# Patient Record
Sex: MALE
[De-identification: names, ages, dates, MRNs, and addresses within clinical notes are randomized per-mention and may not be internally consistent; named-entity substitution may affect disease eponyms.]

---

## 2019-02-19 ENCOUNTER — HOSPITAL ENCOUNTER (EMERGENCY)
Dept: HOSPITAL 97 - ER | Age: 19
Discharge: HOME | End: 2019-02-19
Payer: SELF-PAY

## 2019-02-19 DIAGNOSIS — J06.9: Primary | ICD-10-CM

## 2019-02-19 DIAGNOSIS — H66.002: ICD-10-CM

## 2019-02-19 PROCEDURE — 99283 EMERGENCY DEPT VISIT LOW MDM: CPT

## 2019-02-19 NOTE — EDPHYS
Physician Documentation                                                                           

 [facility]                                                                

Name: Jacobo Rivera                                                                                 

Age: 18 yrs                                                                                       

Sex: Male                                                                                         

: 2000                                                                                   

MRN: [id number]                                                                                   

Arrival Date: 2019                                                                          

Time: 23:18                                                                                       

Account#: [id number]                                                                            

Bed 23                                                                                            

Private MD:                                                                                       

ED Physician Orville Curtis                                                                         

HPI:                                                                                              

                                                                                             

02:45 This 18 yrs old  Male presents to ER via Ambulatory with complaints of Ear Pain.snw 

02:45 The patient presents with pain, that is acute. The complaints affect the right ear and  snw 

      left ear. Onset: The symptoms/episode began/occurred suddenly, 3 day(s) ago, and became     

      persistent. Associated signs and symptoms: Pertinent positives: sore throat. Severity       

      of symptoms: At their worst the symptoms were moderate. The patient has experienced a       

      previous episode. It is unknown whether or not the patient has recently seen a              

      physician.                                                                                  

                                                                                                  

Historical:                                                                                       

- Allergies:                                                                                      

                                                                                             

23:28 No Known Allergies;                                                                     lp1 

- Home Meds:                                                                                      

23:28 None [Active];                                                                          lp1 

- PMHx:                                                                                           

23:28 None;                                                                                   lp1 

- PSHx:                                                                                           

23:28 None;                                                                                   lp1 

                                                                                                  

- Immunization history:: Adult Immunizations up to date.                                          

- Social history:: Smoking status: Patient/guardian denies using tobacco.                         

- Ebola Screening: : No symptoms or risks identified at this time.                                

                                                                                                  

                                                                                                  

ROS:                                                                                              

                                                                                             

02:44 Eyes: Negative for injury, pain, redness, and discharge.                                snw 

      Neck: Negative for injury, pain, and swelling, Cardiovascular: Negative for chest pain,     

      palpitations, and edema.                                                                    

      Abdomen/GI: Negative for abdominal pain, nausea, vomiting, diarrhea, and constipation,      

      Back: Negative for injury and pain, : Negative for injury, bleeding, discharge, and       

      swelling, MS/Extremity: Negative for injury and deformity, Skin: Negative for injury,       

      rash, and discoloration, Neuro: Negative for headache, weakness, numbness, tingling,        

      and seizure.                                                                                

      Constitutional: Positive for body aches, chills, malaise.                                   

      ENT: Positive for ear pain.                                                                 

      Respiratory: Positive for cough.                                                            

                                                                                                  

Exam:                                                                                             

02:44 Constitutional:  This is a well developed, well nourished patient who is awake, alert,  snw 

      and in no acute distress. Head/Face:  Normocephalic, atraumatic. Eyes:  Pupils equal        

      round and reactive to light, extra-ocular motions intact.  Lids and lashes normal.          

      Conjunctiva and sclera are non-icteric and not injected.  Cornea within normal limits.      

      Periorbital areas with no swelling, redness, or edema. Neck:  Trachea midline, no           

      thyromegaly or masses palpated, and no cervical lymphadenopathy.  Supple, full range of     

      motion without nuchal rigidity, or vertebral point tenderness.  No Meningismus.             

      Chest/axilla:  Normal chest wall appearance and motion.  Nontender with no deformity.       

      No lesions are appreciated. Cardiovascular:  Regular rate and rhythm with a normal S1       

      and S2.  No gallops, murmurs, or rubs.  Normal PMI, no JVD.  No pulse deficits.             

      Respiratory:  Lungs have equal breath sounds bilaterally, clear to auscultation and         

      percussion.  No rales, rhonchi or wheezes noted.  No increased work of breathing, no        

      retractions or nasal flaring. Abdomen/GI:  Soft, non-tender, with normal bowel sounds.      

      No distension or tympany.  No guarding or rebound.  No evidence of tenderness               

      throughout. Back:  No spinal tenderness.  No costovertebral tenderness.  Full range of      

      motion. Skin:  Warm, dry with normal turgor.  Normal color with no rashes, no lesions,      

      and no evidence of cellulitis. MS/ Extremity:  Pulses equal, no cyanosis.                   

      Neurovascular intact.  Full, normal range of motion. Neuro:  Awake and alert, GCS 15,       

      oriented to person, place, time, and situation.  Cranial nerves II-XII grossly intact.      

      Motor strength 5/5 in all extremities.  Sensory grossly intact.  Cerebellar exam            

      normal.  Normal gait.                                                                       

02:44 ENT: External ear(s): are unremarkable, TM's: erythema, that is moderate, on the left,      

      Nose: is normal, Mouth: is normal, Posterior pharynx: erythema, that is mild, Voice: is     

      normal.                                                                                     

                                                                                                  

Vital Signs:                                                                                      

                                                                                             

23:28  / 99; Pulse 90; Resp 18; Temp 98.6(O); Pulse Ox 98% on R/A; Weight 108.86 kg;    lp1 

      Height 5 ft. 7 in. (170.18 cm); Pain 10/10;                                                 

23:28 Body Mass Index 37.59 (108.86 kg, 170.18 cm)                                            lp1 

                                                                                                  

MDM:                                                                                              

23:35 Patient medically screened.                                                             snw 

02/20                                                                                             

02:45 Data reviewed: vital signs, nurses notes. Data interpreted: Pulse oximetry: on room air snw 

      is 98 %. Interpretation: normal. Counseling: I had a detailed discussion with the           

      patient and/or guardian regarding: the historical points, exam findings, and any            

      diagnostic results supporting the discharge/admit diagnosis, the need for outpatient        

      follow up, to return to the emergency department if symptoms worsen or persist or if        

      there are any questions or concerns that arise at home. Special discussion: I have          

      referred the patient to see his PCP for further evaluation of high blood pressure.          

      Based on the history and exam findings, there is no indication for further emergent         

      testing or inpatient evaluation. I discussed with the patient/guardian the need to see      

      the primary care provider for further evaluation of the symptoms.                           

                                                                                                  

Administered Medications:                                                                         

                                                                                             

23:56 Drug: Norco 5 mg-325 mg 1 tabs Route: PO;                                               rv  

23:59 Follow up: Response: Medication administered at discharge.                              rv  

23:56 Drug: Augmentin 875 mg Route: PO;                                                       rv  

23:59 Follow up: Response: Medication administered at discharge.                              rv  

23:56 Drug: predniSONE 20 mg Route: PO;                                                       rv  

23:59 Follow up: Response: Medication administered at discharge.                              rv  

                                                                                                  

                                                                                                  

Disposition:                                                                                      

                                                                                             

03:12 Co-signature as Attending Physician, Orville Curtis MD.                                    rn  

                                                                                                  

Disposition:                                                                                      

19 23:41 Discharged to Home. Impression: Acute upper respiratory infection, unspecified,    

  Acute suppurative otitis media.                                                                 

- Condition is Stable.                                                                            

- Discharge Instructions: Otitis Media, Adult, Hypertension, Upper Respiratory                    

  Infection, Adult, Cool Mist Vaporizer, Heat Therapy.                                            

- Prescriptions for Augmentin 875- 125 mg Oral Tablet - take 1 tablet by ORAL route               

  every 12 hours for 10 days; 20 tablet. Prednisone 20 mg Oral Tablet - take 2 tablet             

  by ORAL route once daily for 5 days; 10 tablet.                                                 

- Work release form, Medication Reconciliation Form, Thank You Letter, Antibiotic                 

  Education, Prescription Opioid Use form.                                                        

- Follow up: Private Physician; When: 2 - 3 days; Reason: Recheck today's complaints,             

  Continuance of care, Re-evaluation by your physician. Follow up: Emergency                      

  Department; When: As needed; Reason: Worsening of condition.                                    

                                                                                                  

                                                                                                  

                                                                                                  

Signatures:                                                                                       

Luann Swain, FNP-C                 FNP-Csnw                                                  

Orville Curtis MD MD rn Pena, Laura, RN                         RN   lp1                                                  

Jalen, Vik, RN                    RN   rv                                                   

                                                                                                  

Corrections: (The following items were deleted from the chart)                                    

                                                                                             

23:58 23:41 2019 23:41 Discharged to Home. Impression: Acute upper respiratory          rv  

      infection, unspecified; Acute suppurative otitis media. Condition is Stable. Forms are      

      Medication Reconciliation Form, Thank You Letter, Antibiotic Education, Prescription        

      Opioid Use. Follow up: Private Physician; When: 2 - 3 days; Reason: Recheck today's         

      complaints, Continuance of care, Re-evaluation by your physician. Follow up: Emergency      

      Department; When: As needed; Reason: Worsening of condition. snw                            

                                                                                                  

**************************************************************************************************

## 2019-07-26 ENCOUNTER — HOSPITAL ENCOUNTER (EMERGENCY)
Dept: HOSPITAL 97 - ER | Age: 19
Discharge: HOME | End: 2019-07-26
Payer: SELF-PAY

## 2019-07-26 DIAGNOSIS — J02.9: Primary | ICD-10-CM

## 2019-07-26 DIAGNOSIS — Z88.0: ICD-10-CM

## 2019-07-26 PROCEDURE — 99284 EMERGENCY DEPT VISIT MOD MDM: CPT

## 2019-07-26 PROCEDURE — 86308 HETEROPHILE ANTIBODY SCREEN: CPT

## 2019-07-26 PROCEDURE — 36415 COLL VENOUS BLD VENIPUNCTURE: CPT

## 2019-07-26 PROCEDURE — 87070 CULTURE OTHR SPECIMN AEROBIC: CPT

## 2019-07-26 PROCEDURE — 87081 CULTURE SCREEN ONLY: CPT

## 2019-07-26 NOTE — EDPHYS
Physician Documentation                                                                           

 [facility]\""                                                                 

Name: Jacobo Rivera                                                                                 

Age: 18 yrs                                                                                       

Sex: Male                                                                                         

: 2000                                                                                   

MRN: [id number]                                                                                   

Arrival Date: 2019                                                                          

Time: 13:48                                                                                       

Account#: [id number]                                                                            

Bed 27                                                                                            

Private MD:                                                                                       

[facility] Physician Everardo Nair                                                                      

HPI:                                                                                              

                                                                                             

14:48 This 18 yrs old  Male presents to ER via Ambulatory with complaints of          kb  

      Headache, Nausea/Vomiting, Weakness.                                                        

14:48 The patient presents with sore throat. The patient describes throat pain as constant.   kb  

      Onset: The symptoms/episode began/occurred 2 day(s) ago. Severity of symptoms: At their     

      worst the symptoms were moderate, in the emergency department the symptoms are              

      unchanged. Modifying factors: The symptoms are alleviated by nothing, the symptoms are      

      aggravated by swallowing, Patient's oral intake status: good. Associated signs and          

      symptoms: Pertinent positives: chills, fever, flu-like symptoms, malaise, Sore throat       

      vomiting. The patient has not experienced similar symptoms in the past. The patient has     

      not recently seen a physician. Pt reports cough for 2 weeks, sore throat for 2 days and     

      fever today. States he tried to go to work but vomited when he was there and was tired      

      and weak so he went home. .                                                                 

                                                                                                  

Historical:                                                                                       

- Allergies:                                                                                      

14:35 PENICILLINS;                                                                            ss  

- Home Meds:                                                                                      

14:35 None [Active];                                                                          ss  

- PMHx:                                                                                           

14:35 None;                                                                                   ss  

- PSHx:                                                                                           

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  

                                                                                                  

- Immunization history:: Adult Immunizations up to date.                                          

- Social history:: Smoking status: Patient/guardian denies using tobacco.                         

- Ebola Screening: : Patient denies exposure to infectious person Patient denies travel           

  to an Ebola-affected area in the 21 days before illness onset.                                  

                                                                                                  

                                                                                                  

ROS:                                                                                              

14:45 Neck: Negative for injury, pain, and swelling, Cardiovascular: Negative for chest pain, kb  

      palpitations, and edema, Back: Negative for injury and pain, MS/Extremity: Negative for     

      injury and deformity, Skin: Negative for injury, rash, and discoloration, Neuro:            

      Negative for headache, weakness, numbness, tingling, and seizure.                           

14:45 Constitutional: Positive for body aches, chills, fatigue, fever, malaise, Negative for      

      poor PO intake, weight loss.                                                                

14:45 ENT: Positive for sore throat.                                                              

14:45 Respiratory: Positive for cough, Negative for dyspnea on exertion, hemoptysis,              

      orthopnea, pleurisy, shortness of breath, sputum production, wheezing.                      

14:45 Abdomen/GI: Positive for vomiting, Negative for abdominal pain, diarrhea, constipation.     

                                                                                                  

Exam:                                                                                             

14:45 Constitutional:  This is a well developed, well nourished patient who is awake, alert,  kb  

      and in no acute distress. Head/Face:  Normocephalic, atraumatic. Neck:  Trachea             

      midline, no thyromegaly or masses palpated, and no cervical lymphadenopathy.  Supple,       

      full range of motion without nuchal rigidity, or vertebral point tenderness.  No            

      Meningismus. Chest/axilla:  Normal chest wall appearance and motion.  Nontender with no     

      deformity.  No lesions are appreciated. Cardiovascular:  Regular rate and rhythm with a     

      normal S1 and S2.  No gallops, murmurs, or rubs.  Normal PMI, no JVD.  No pulse             

      deficits. Respiratory:  Lungs have equal breath sounds bilaterally, clear to                

      auscultation and percussion.  No rales, rhonchi or wheezes noted.  No increased work of     

      breathing, no retractions or nasal flaring. Abdomen/GI:  Soft, non-tender, with normal      

      bowel sounds.  No distension or tympany.  No guarding or rebound.  No evidence of           

      tenderness throughout. Skin:  Warm, dry with normal turgor.  Normal color with no           

      rashes, no lesions, and no evidence of cellulitis. MS/ Extremity:  Pulses equal, no         

      cyanosis.  Neurovascular intact.  Full, normal range of motion. Neuro:  Awake and           

      alert, GCS 15, oriented to person, place, time, and situation.  Cranial nerves II-XII       

      grossly intact.  Motor strength 5/5 in all extremities.  Sensory grossly intact.            

      Cerebellar exam normal.  Normal gait.                                                       

14:45 ENT: Posterior pharynx: Airway: normal, no evidence of obstruction, patent, Tonsils:        

      bilaterally enlarged, with erythema, Uvula: normal, midline, swelling, that is              

      moderate, erythema, that is moderate, exudate, that is mild.                                

                                                                                                  

Vital Signs:                                                                                      

14:33  / 68; Pulse 128; Resp 22; Temp 103(O); Pulse Ox 96% on R/A; Weight 113.4 kg;     ss  

      Height 5 ft. 7 in. (170.18 cm); Pain 7/10;                                                  

15:40  / 61; Pulse 115; Resp 16 S; Pulse Ox 100% on R/A;                                ca1 

16:50  / 80; Pulse 108; Resp 16 S; Temp 99.8(O); Pulse Ox 97% on R/A;                   ca1 

17:05 Temp 99.8(O);                                                                           ca1 

14:33 Body Mass Index 39.16 (113.40 kg, 170.18 cm)                                            ss  

                                                                                                  

Jennifer Coma Score:                                                                               

14:43 Eye Response: spontaneous(4). Verbal Response: oriented(5). Motor Response: obeys       kb  

      commands(6). Total: 15.                                                                     

                                                                                                  

MDM:                                                                                              

14:43 Patient medically screened.                                                             kb  

14:43 Data reviewed: vital signs, nurses notes. Data interpreted: Pulse oximetry: on room air kb  

      is 96 %. Interpretation: normal. Counseling: I had a detailed discussion with the           

      patient and/or guardian regarding: the historical points, exam findings, and any            

      diagnostic results supporting the discharge/admit diagnosis, lab results, the need for      

      outpatient follow up, a family practitioner, to return to the emergency department if       

      symptoms worsen or persist or if there are any questions or concerns that arise at home.    

                                                                                                  

                                                                                             

14:36 Order name: Strep; Complete Time: 15:24                                                 ss  

                                                                                             

15:25 Order name: Throat Culture                                                              EDMS

                                                                                             

15:24 Order name: Mono Screen Profile; Complete Time: 16:42                                   kb  

                                                                                             

16:44 Order name: Vital Signs; Complete Time: 16:50                                           kb  

                                                                                                  

Administered Medications:                                                                         

14:40 Drug: Motrin 800 mg Route: PO;                                                          ss  

17:05 Follow up: Temp 99.8 Oral; Response: No adverse reaction; Temperature is decreased      ca1 

                                                                                                  

                                                                                                  

Disposition:                                                                                      

                                                                                             

09:58 Co-signature as Attending Physician, Everardo Nair MD I agree with the assessment and  vishal 

      plan of care.                                                                               

                                                                                                  

Disposition:                                                                                      

19 16:52 Discharged to Home. Impression: Acute pharyngitis.                                 

- Condition is Stable.                                                                            

- Discharge Instructions: Pharyngitis, Easy-to-Read, Sore Throat, Easy-to-Read.                   

- Prescriptions for Zithromax 500 mg Oral Tablet - take 1 tablet by ORAL route once               

  daily for 5 days; 5 tablet.                                                                     

- Medication Reconciliation Form, Thank You Letter, Antibiotic Education, Prescription            

  Opioid Use, Work release form form.                                                             

- Follow up: Emergency Department; When: As needed; Reason: Worsening of condition.               

  Follow up: Private Physician; When: 2 - 3 days; Reason: Recheck today's complaints,             

  Continuance of care, Re-evaluation by your physician.                                           

                                                                                                  

                                                                                                  

                                                                                                  

Signatures:                                                                                       

Dispatcher [employer]                           [facility]                                                 

Netta Pacheco, FNP-C                 BETTIEP-Everardo Villafana MD MD cha Smirch, Shelby, ARVIND                      RN   ss                                                   

Adele Lion RN                        RN   ca1                                                  

                                                                                                  

Corrections: (The following items were deleted from the chart)                                    

                                                                                             

14:47 14:38 Influenza Screen (A \T\ B)+[id number] ordered. [facility]

17:18 16:52 2019 16:52 Discharged to Home. Impression: Acute pharyngitis. Condition is  ca1 

      Stable. Forms are Medication Reconciliation Form, Thank You Letter, Antibiotic              

      Education, Prescription Opioid Use. Follow up: Emergency Department; When: As needed;       

      Reason: Worsening of condition. Follow up: Private Physician; When: 2 - 3 days; Reason:     

      Recheck today's complaints, Continuance of care, Re-evaluation by your physician. kb        

                                                                                                  

**************************************************************************************************

## 2019-07-26 NOTE — ER
Nurse's Notes                                                                                     

 [facility]                                                                 

Name: Jacobo Rivera                                                                                 

Age: 18 yrs                                                                                       

Sex: Male                                                                                         

: 2000                                                                                   

MRN: [id number]                                                                                   

Arrival Date: 2019                                                                          

Time: 13:48                                                                                       

Account#: [id number]                                                                            

Bed 27                                                                                            

Private MD:                                                                                       

Diagnosis: Acute pharyngitis                                                                      

                                                                                                  

Presentation:                                                                                     

                                                                                             

14:34 Presenting complaint: Patient states: headache, body aches and fatigue that began this  ss  

      morning. Sore throat x 2 days and cough. Transition of care: patient was not received       

      from another setting of care. Onset of symptoms was 2019. Risk Assessment: Do      

      you want to hurt yourself or someone else? Patient reports no desire to harm self or        

      others. Initial Sepsis Screen: Does the patient meet any 2 criteria? No. Patient's          

      initial sepsis screen is negative. Does the patient have a suspected source of              

      infection? No. Patient's initial sepsis screen is negative. Care prior to arrival: None.    

14:34 Method Of Arrival: Ambulatory                                                           ss  

14:34 Acuity: NIKITA 3                                                                           ss  

                                                                                                  

Historical:                                                                                       

- Allergies:                                                                                      

14:35 PENICILLINS;                                                                            ss  

- Home Meds:                                                                                      

14:35 None [Active];                                                                          ss  

- PMHx:                                                                                           

14:35 None;                                                                                   ss  

- PSHx:                                                                                           

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  

                                                                                                  

- Immunization history:: Adult Immunizations up to date.                                          

- Social history:: Smoking status: Patient/guardian denies using tobacco.                         

- Ebola Screening: : Patient denies exposure to infectious person Patient denies travel           

  to an Ebola-affected area in the 21 days before illness onset.                                  

                                                                                                  

                                                                                                  

Screening:                                                                                        

15:40 Abuse screen: Denies threats or abuse. Denies injuries from another. Nutritional        ca1 

      screening: No deficits noted. Tuberculosis screening: No symptoms or risk factors           

      identified. Fall Risk None identified.                                                      

                                                                                                  

Assessment:                                                                                       

15:40 General: Appears in no apparent distress. comfortable, Behavior is calm, cooperative,   ca1 

      appropriate for age. Pain: Denies pain. Neuro: Level of Consciousness is awake, alert,      

      obeys commands, Oriented to person, place, time, situation. Cardiovascular: Heart tones     

      S1 S2 present Capillary refill < 3 seconds Patient's skin is warm and dry. Respiratory:     

      Airway is patent Respiratory effort is even, unlabored, Respiratory pattern is regular,     

      symmetrical, Breath sounds are clear bilaterally. GI: Abdomen is round non-distended,       

      Bowel sounds present X 4 quads. Abd is soft and non tender X 4 quads. : No deficits       

      noted. No signs and/or symptoms were reported regarding the genitourinary system. EENT:     

      No deficits noted. No signs and/or symptoms were reported regarding the EENT system.        

      Derm: Skin is intact, is healthy with good turgor, Skin is pink, warm \T\ dry.              

      Musculoskeletal: Circulation, motion, and sensation intact. Capillary refill < 3            

      seconds, Range of motion: intact in all extremities.                                        

16:50 Reassessment: Patient appears in no apparent distress at this time. Patient and/or      ca1 

      family updated on plan of care and expected duration. Pain level reassessed. Patient is     

      alert, oriented x 3, equal unlabored respirations, skin warm/dry/pink.                      

                                                                                                  

Vital Signs:                                                                                      

14:33  / 68; Pulse 128; Resp 22; Temp 103(O); Pulse Ox 96% on R/A; Weight 113.4 kg;     ss  

      Height 5 ft. 7 in. (170.18 cm); Pain 7/10;                                                  

15:40  / 61; Pulse 115; Resp 16 S; Pulse Ox 100% on R/A;                                ca1 

16:50  / 80; Pulse 108; Resp 16 S; Temp 99.8(O); Pulse Ox 97% on R/A;                   ca1 

17:05 Temp 99.8(O);                                                                           ca1 

14:33 Body Mass Index 39.16 (113.40 kg, 170.18 cm)                                            ss  

                                                                                                  

Jennifer Coma Score:                                                                               

14:43 Eye Response: spontaneous(4). Verbal Response: oriented(5). Motor Response: obeys       kb  

      commands(6). Total: 15.                                                                     

                                                                                                  

ED Course:                                                                                        

13:48 Patient arrived in ED.                                                                  as  

14:33 Arm band placed on right wrist.                                                         ss  

14:35 Triage completed.                                                                       ss  

14:38 Netta Pacheco FNP-C is PHCP.                                                        kb  

14:38 Everardo Nair MD is Attending Physician.                                             kb  

15:37 Adele Lion, RN is Primary Nurse.                                                      ca1 

15:40 Patient has correct armband on for positive identification. Bed in low position. Call   ca1 

      light in reach. Side rails up X 1. Pulse ox on. NIBP on.                                    

15:40 No provider procedures requiring assistance completed. Patient did not have IV access   ca1 

      during this emergency room visit.                                                           

16:08 Mono Screen Profile Sent.                                                               ca1 

                                                                                                  

Administered Medications:                                                                         

14:40 Drug: Motrin 800 mg Route: PO;                                                          ss  

17:05 Follow up: Temp 99.8 Oral; Response: No adverse reaction; Temperature is decreased      ca1 

                                                                                                  

                                                                                                  

Outcome:                                                                                          

16:52 Discharge ordered by MD.                                                                jacek  

17:17 Discharged to home ambulatory.                                                          ca1 

17:17 Condition: stable                                                                           

17:17 Discharge instructions given to patient, Instructed on discharge instructions, follow       

      up and referral plans. medication usage, Demonstrated understanding of instructions,        

      follow-up care, medications, Prescriptions given X 1.                                       

17:18 Patient left the ED.                                                                    ca1 

                                                                                                  

Signatures:                                                                                       

Netta Pacheco, FNP-C                 FNP-Juli Turner Shelby, ARVIND                      RN   ss                                                   

Adele Lion RN                        RN   ca1                                                  

                                                                                                  

**************************************************************************************************

## 2022-05-06 NOTE — ER
Nurse's Notes                                                                                     

 [facility]                                                                

Name: Jacobo Rivera                                                                                 

Age: 18 yrs                                                                                       

Sex: Male                                                                                         

: 2000                                                                                   

MRN: [id number]                                                                                   

Arrival Date: 2019                                                                          

Time: 23:18                                                                                       

Account#: [id number]                                                                            

Bed 23                                                                                            

Private MD:                                                                                       

Diagnosis: Acute upper respiratory infection, unspecified;Acute suppurative otitis media          

                                                                                                  

Presentation:                                                                                     

                                                                                             

23:27 Presenting complaint: Patient states: Bilateral ear pain for a couple days now; states  lp1 

      having congestion prior to ear pain. Transition of care: patient was not received from      

      another setting of care. Onset of symptoms was 2019. Risk Assessment: Do       

      you want to hurt yourself or someone else? Patient reports no desire to harm self or        

      others. Initial Sepsis Screen: Does the patient meet any 2 criteria? No. Patient's          

      initial sepsis screen is negative. Does the patient have a suspected source of              

      infection? No. Patient's initial sepsis screen is negative. Care prior to arrival: None.    

23:27 Method Of Arrival: Ambulatory                                                           lp1 

23:27 Acuity: NIKITA 5                                                                           lp1 

                                                                                                  

Triage Assessment:                                                                                

23:56 General: Appears.                                                                       rv  

                                                                                                  

Historical:                                                                                       

- Allergies:                                                                                      

23:28 No Known Allergies;                                                                     lp1 

- Home Meds:                                                                                      

23:28 None [Active];                                                                          lp1 

- PMHx:                                                                                           

23:28 None;                                                                                   lp1 

- PSHx:                                                                                           

23:28 None;                                                                                   lp1 

                                                                                                  

- Immunization history:: Adult Immunizations up to date.                                          

- Social history:: Smoking status: Patient/guardian denies using tobacco.                         

- Ebola Screening: : No symptoms or risks identified at this time.                                

                                                                                                  

                                                                                                  

Screenin:29 Abuse screen: Denies threats or abuse. Denies injuries from another. Nutritional        lp1 

      screening: No deficits noted. Tuberculosis screening: No symptoms or risk factors           

      identified. Fall Risk None identified.                                                      

                                                                                                  

Assessment:                                                                                       

23:56 General: Appears in no apparent distress. uncomfortable, Behavior is calm, cooperative. rv  

      Pain: Complains of pain in BOTH EARS. Neuro: Level of Consciousness is awake, alert,        

      obeys commands, Oriented to person, place, time, situation. Cardiovascular: Capillary       

      refill < 3 seconds. Respiratory: Airway is patent. GI: No signs and/or symptoms were        

      reported involving the gastrointestinal system. : No signs and/or symptoms were           

      reported regarding the genitourinary system. EENT: Tympanic membrane. Derm: Skin is         

      intact. Musculoskeletal: No signs and/or symptoms reported regarding the                    

      musculoskeletal system.                                                                     

                                                                                                  

Vital Signs:                                                                                      

23:28  / 99; Pulse 90; Resp 18; Temp 98.6(O); Pulse Ox 98% on R/A; Weight 108.86 kg;    lp1 

      Height 5 ft. 7 in. (170.18 cm); Pain 10/10;                                                 

23:28 Body Mass Index 37.59 (108.86 kg, 170.18 cm)                                            lp1 

                                                                                                  

ED Course:                                                                                        

23:18 Patient arrived in ED.                                                                  es  

23:21 Luann Swain FNP-C is PHCP.                                                        snw 

23:21 Orville Curtis MD is Attending Physician.                                                snw 

23:28 Triage completed.                                                                       lp1 

23:29 Arm band placed on left wrist.                                                          lp1 

23:29 Patient has correct armband on for positive identification.                             lp1 

23:57 No provider procedures requiring assistance completed. Patient did not have IV access   rv  

      during this emergency room visit.                                                           

                                                                                                  

Administered Medications:                                                                         

23:56 Drug: Norco 5 mg-325 mg 1 tabs Route: PO;                                               rv  

23:59 Follow up: Response: Medication administered at discharge.                              rv  

23:56 Drug: Augmentin 875 mg Route: PO;                                                       rv  

23:59 Follow up: Response: Medication administered at discharge.                              rv  

23:56 Drug: predniSONE 20 mg Route: PO;                                                       rv  

23:59 Follow up: Response: Medication administered at discharge.                              rv  

                                                                                                  

                                                                                                  

Outcome:                                                                                          

23:41 Discharge ordered by MD.                                                                snw 

23:58 Discharged to home ambulatory.                                                          rv  

23:58 Condition: good                                                                             

23:58 Discharge instructions given to patient, Instructed on discharge instructions, follow       

      up and referral plans. medication usage, Demonstrated understanding of instructions,        

      follow-up care, medications, Prescriptions given X 2.                                       

23:58 Patient left the ED.                                                                    rv  

                                                                                                  

Signatures:                                                                                       

Luann Swain FNP-C                 FNP-Csnw                                                  

Nely Gaviria Laura, RN                         RN   lp1                                                  

Vik Rao RN                    RN   rv                                                   

                                                                                                  

**************************************************************************************************
As per Dr. Macdonald, okay to not draw HGA1C  FS on admit  Advised to hold Metformin 24 hours prior to surgery; Last dose 5/16/22 at bedtime